# Patient Record
Sex: FEMALE | Race: WHITE
[De-identification: names, ages, dates, MRNs, and addresses within clinical notes are randomized per-mention and may not be internally consistent; named-entity substitution may affect disease eponyms.]

---

## 2020-09-10 ENCOUNTER — HOSPITAL ENCOUNTER (INPATIENT)
Dept: HOSPITAL 74 - JER | Age: 65
LOS: 5 days | Discharge: HOME | DRG: 854 | End: 2020-09-15
Attending: INTERNAL MEDICINE | Admitting: STUDENT IN AN ORGANIZED HEALTH CARE EDUCATION/TRAINING PROGRAM
Payer: COMMERCIAL

## 2020-09-10 VITALS — BODY MASS INDEX: 26.1 KG/M2

## 2020-09-10 DIAGNOSIS — K76.0: ICD-10-CM

## 2020-09-10 DIAGNOSIS — K29.70: ICD-10-CM

## 2020-09-10 DIAGNOSIS — K44.9: ICD-10-CM

## 2020-09-10 DIAGNOSIS — K80.01: ICD-10-CM

## 2020-09-10 DIAGNOSIS — E03.9: ICD-10-CM

## 2020-09-10 DIAGNOSIS — K57.10: ICD-10-CM

## 2020-09-10 DIAGNOSIS — R65.20: ICD-10-CM

## 2020-09-10 DIAGNOSIS — A41.89: Primary | ICD-10-CM

## 2020-09-10 LAB
ALBUMIN SERPL-MCNC: 3.6 G/DL (ref 3.4–5)
ALP SERPL-CCNC: 112 U/L (ref 45–117)
ALT SERPL-CCNC: 23 U/L (ref 13–61)
ANION GAP SERPL CALC-SCNC: 6 MMOL/L (ref 8–16)
AST SERPL-CCNC: 16 U/L (ref 15–37)
BASOPHILS # BLD: 0.5 % (ref 0–2)
BILIRUB SERPL-MCNC: 0.4 MG/DL (ref 0.2–1)
BUN SERPL-MCNC: 12.6 MG/DL (ref 7–18)
CALCIUM SERPL-MCNC: 8.8 MG/DL (ref 8.5–10.1)
CHLORIDE SERPL-SCNC: 102 MMOL/L (ref 98–107)
CO2 SERPL-SCNC: 30 MMOL/L (ref 21–32)
CREAT SERPL-MCNC: 1.1 MG/DL (ref 0.55–1.3)
DEPRECATED RDW RBC AUTO: 13.4 % (ref 11.6–15.6)
EOSINOPHIL # BLD: 0.8 % (ref 0–4.5)
GLUCOSE SERPL-MCNC: 98 MG/DL (ref 74–106)
HCT VFR BLD CALC: 44 % (ref 32.4–45.2)
HGB BLD-MCNC: 15 GM/DL (ref 10.7–15.3)
LIPASE SERPL-CCNC: 138 U/L (ref 73–393)
LYMPHOCYTES # BLD: 22.8 % (ref 8–40)
MCH RBC QN AUTO: 30.7 PG (ref 25.7–33.7)
MCHC RBC AUTO-ENTMCNC: 34.1 G/DL (ref 32–36)
MCV RBC: 90 FL (ref 80–96)
MONOCYTES # BLD AUTO: 7.9 % (ref 3.8–10.2)
NEUTROPHILS # BLD: 68 % (ref 42.8–82.8)
PLATELET # BLD AUTO: 245 K/MM3 (ref 134–434)
PMV BLD: 8.2 FL (ref 7.5–11.1)
POTASSIUM SERPLBLD-SCNC: 4.3 MMOL/L (ref 3.5–5.1)
PROT SERPL-MCNC: 7.6 G/DL (ref 6.4–8.2)
RBC # BLD AUTO: 4.89 M/MM3 (ref 3.6–5.2)
SODIUM SERPL-SCNC: 137 MMOL/L (ref 136–145)
WBC # BLD AUTO: 7.3 K/MM3 (ref 4–10)

## 2020-09-10 PROCEDURE — U0003 INFECTIOUS AGENT DETECTION BY NUCLEIC ACID (DNA OR RNA); SEVERE ACUTE RESPIRATORY SYNDROME CORONAVIRUS 2 (SARS-COV-2) (CORONAVIRUS DISEASE [COVID-19]), AMPLIFIED PROBE TECHNIQUE, MAKING USE OF HIGH THROUGHPUT TECHNOLOGIES AS DESCRIBED BY CMS-2020-01-R: HCPCS

## 2020-09-10 RX ADMIN — ALUMINUM HYDROXIDE, MAGNESIUM HYDROXIDE, AND SIMETHICONE ONE: 200; 200; 20 SUSPENSION ORAL at 18:10

## 2020-09-10 RX ADMIN — LIDOCAINE HYDROCHLORIDE ONE: 20 SOLUTION ORAL; TOPICAL at 18:09

## 2020-09-10 RX ADMIN — LIDOCAINE HYDROCHLORIDE ONE ML: 20 SOLUTION ORAL; TOPICAL at 18:05

## 2020-09-10 RX ADMIN — ALUMINUM HYDROXIDE, MAGNESIUM HYDROXIDE, AND SIMETHICONE ONE ML: 200; 200; 20 SUSPENSION ORAL at 18:05

## 2020-09-10 NOTE — PDOC
History of Present Illness





- General


Chief Complaint: Pain


Stated Complaint: PAIN


Time Seen by Provider: 09/10/20 17:10





- History of Present Illness


Initial Comments: 


65 YOF h/o hypothyroidism by gastritis presents for abdominal pain and melena of

two days duration. Per patient pain is located in the epigastrum radiating 

outward laterally and to the back. She describes the pain as 10/10 in intensity,

burning and stabbing in quality, she tried to take omeprazole but didnt 

experience any relief. She also endorses a bout of vomiting 2 days prior, as 

well as multiple bouts of tarry stool and some burning chest pain. Denies SOB, 

fever, chills, nausea, or diarrhea, changes in urinary habit. 








Past History





- Medical History


Allergies/Adverse Reactions: 


                                    Allergies











Allergy/AdvReac Type Severity Reaction Status Date / Time


 


No Known Allergies Allergy   Verified 09/10/20 16:33











Home Medications: 


Ambulatory Orders





Levothyroxine [Synthroid -] 50 mcg PO DAILY 09/10/20 








COPD: No


GI Disorders: Yes (GASTRITIS)


HTN: Yes


Thyroid Disease: Yes (HYPO)





- Reproductive History


Is Patient Pregnant Now?: No





- Psycho-Social/Smoking History


Smoking Status: No


Smoking History: Never smoked


Have you smoked in the past 12 months: No


Number of Cigarettes Smoked Daily: 0


Information on smoking cessation initiated: No





- Substance Abuse Hx (Audit-C & DAST Scrn)


How often the patient has a drink containing alcohol: Never


Score: In Men: 4 or > Positive; In Women: 3 or > Positive: 0


Screen Result (Pos requires Nsg. Audit-10AR): Negative


In the last yr the pt used illegal drug/Rx for NonMed reason: No


Score:  Yes response is considered Positive: 0


Screen Result (Positive result requires Nsg. DAST-10): Negative





**Review of Systems





- Review of Systems


Constitutional: Yes: See HPI


HEENTM: Yes: See HPI


Respiratory: Yes: See HPI


Cardiac (ROS): Yes: See HPI


ABD/GI: Yes: See HPI


: Yes: See HPI


Musculoskeletal: Yes: See HPI


Integumentary: Yes: See HPI


Neurological: Yes: See HPI


Endocrine: Yes: See HPI


Hematologic/Lymphatic: Yes: See HPI





*Physical Exam





- Vital Signs


                                Last Vital Signs











Temp Pulse Resp BP Pulse Ox


 


 98.6 F   76   18   132/83   97 


 


 09/10/20 16:29  09/10/20 16:29  09/10/20 16:29  09/10/20 16:29  09/10/20 16:29














- Physical Exam


General Appearance: Yes: Nourished, Moderate Distress


HEENT: positive: EOMI, LEE, Normal ENT Inspection, Normal Voice


Neck: positive: Trachea midline, Normal Thyroid


Respiratory/Chest: positive: Chest Tender, Lungs Clear, Normal Breath Sounds


Cardiovascular: positive: Regular Rhythm, Regular Rate, S1, S2


Gastrointestinal/Abdominal: positive: Tender, Soft, Tenderness


Rectal Exam: positive: normal exam


Musculoskeletal: positive: Normal Inspection


Extremity: positive: Normal Capillary Refill, Normal Inspection


Integumentary: positive: Normal Color, Dry, Warm





ED Treatment Course





- LABORATORY


CBC & Chemistry Diagram: 


                                 09/11/20 05:15





                                 09/11/20 05:15





Medical Decision Making





- Medical Decision Making


65 YOF h/o hypothyroidism by gastritis presents for abdominal pain and melena of

two days duration. Per patient pain is located in the epigastrum radiating 

outward laterally and to the back. She describes the pain as 10/10 in intensity,

burning and stabbing in quality, she tried to take omeprazole but didnt 

experience any relief. She also endorses a bout of vomiting 2 days prior, as 

well as multiple bouts of tarry stool and some burning chest pain. Denies SOB, 

fever, chills, nausea, or diarrhea, changes in urinary habit. Vitals wnl on 

arrival. Physical exam reveals tenderness to palpation of epigastrum and upper 

two quadrants of abdomen. Rectal exam unremarkable. 





ddx: peptic ulcer disease, AVM, bowel perforation, mesenteric ischemia, bowel 

obstruction 





plan: CBC, CMP, lactate, lipase, type and screen, coags, CT abdomen 





reassess: labs at time of sign out wnl, patient signed out to night team. 





  


09/11/20 07:54








Discharge





- Discharge Information


Problems reviewed: Yes


Clinical Impression/Diagnosis: 


 Acute cholecystitis due to biliary calculus





Condition: Guarded





- Follow up/Referral





- Patient Discharge Instructions





- Post Discharge Activity

## 2020-09-10 NOTE — PDOC
Documentation entered by Yasmin Rahman SCRIBE, acting as scribe for 

Tamar Pulido DO.








Tamar Pulido DO:  This documentation has been prepared by the Lacey lizama Brenda, SCRIBE, under my direction and personally reviewed by me in its 

entirety.  I confirm that the documentation accurately reflects all work, 

treatment, procedures, and medical decision making performed by me.  





Attending Attestation





- Resident


Resident Name: Narendra Trejo





- ED Attending Attestation


I have performed the following: I have examined & evaluated the patient, The 

case was reviewed & discussed with the resident, I agree w/resident's findings &

plan, Exceptions are as noted





- HPI


HPI: 





09/10/20 17:51


The patient is a 65 year old female with a significant PMH of hypothyroidism who

presents to the emergency department fro evaluation of 2 days of melena and 

abdominal pain in the epigastric region radiating outward to her sides and to 

her back. Also endorses vomitting 2 days ago along with burning chest pain. 





The patient denies shortness of breath, headache and dizziness. Denies fever, 

chills and constipation. Denies dysuria, frequency, urgency and hematuria.





Allergies: NKA


Social history: No reported hx of tobacco use, alcohol use or illicit drug use. 


PCP: [facility]ry

















- Physicial Exam


PE: 





09/10/20 17:18


GENERAL: Awake, alert, and fully oriented, in no acute distress


HEAD: No signs of trauma


NECK: Normal ROM, supple, no lymphadenopathy, JVD, or masses


LUNGS: Breath sounds equal, clear to auscultation bilaterally.  No wheezes, and 

no crackles


HEART: Regular rate and rhythm, normal S1 and S2, no murmurs, rubs or gallops


ABDOMEN: (+) Tenderness to palpation diffusely (+) Tender to palpation on the 

upper abdomen,right upper/left upper quadrant to the mid epigastric region to 

the umbilicous. Soft. Normoactive bowel sounds.  No guarding, no rebound.  No 

masses


EXTREMITIES: Normal range of motion, no edema.  No clubbing or cyanosis. No 

cords, erythema, or tenderness


NEUROLOGICAL: Cranial nerves II through XII grossly intact.  Normal speech, 

normal gait


SKIN: Warm, Dry, normal turgor, no rashes or lesions noted.











- Medical Decision Making





09/10/20 18:14


a/p: 64yo female with abd pain x 3 days 


-first 2 days were assoc with n/v - nbnb


-all 3 days with 3 episodes of diarrhea a day, now with black tarry stool


-pt with upper abd pain


-will send labs, lactate, ct abd/pelvis


-protonix iv


-ivf


-will need admission


09/10/20 18:57


hgb 15





09/10/20 19:01


no elevated wbc


09/10/20 19:15


bun/cr normal


trop pending


09/10/20 19:22


trop neg


pt to ct


still with pain, will repeat morphine dosing


09/10/20 22:47


pt with acute jonny


call placed to Dr. Carty


microblog sent to [facility]


MRCP ordered


09/10/20 23:27


resident discussed the case with Dr. Carty who will see the patient in consult


09/10/20 23:42


resident discussed the case with [facility] who accepts pt to service


pts pain controlled at this time





**Heart Score/ECG Review





- ECG Intrepretation


Comment:: 





09/10/20 19:04


sinus at 66, nl axis, nl interval, no acute st/t wave findings





Discharge





- Discharge Information


Problems reviewed: Yes


Clinical Impression/Diagnosis: 


 Acute cholecystitis due to biliary calculus





Condition: Guarded





- Admission


Yes





- Follow up/Referral


Referrals: 


Blake Powell MD [Primary Care Provider] - 





- Patient Discharge Instructions





- Post Discharge Activity

## 2020-09-10 NOTE — PDOC
*Physical Exam





- Vital Signs


                                Last Vital Signs











Temp Pulse Resp BP Pulse Ox


 


 98.6 F   74   18   179/82 H  99 


 


 09/10/20 16:29  09/10/20 19:09  09/10/20 19:09  09/10/20 19:09  09/10/20 19:09














- Physical Exam





09/10/20 19:37


Signout from Dr. Neil BROUSSARD pain 





ED Treatment Course





- LABORATORY


CBC & Chemistry Diagram: 


                                 09/10/20 17:45





                                 09/10/20 17:45





- ADDITIONAL ORDERS


Additional order review: 


                               Laboratory  Results











  09/10/20 09/10/20 09/10/20





  17:45 17:45 17:45


 


Sodium   


 


Potassium   


 


Chloride   


 


Carbon Dioxide   


 


Anion Gap   


 


BUN   


 


Creatinine   


 


Est GFR (CKD-EPI)AfAm   


 


Est GFR (CKD-EPI)NonAf   


 


Random Glucose   


 


Lactic Acid    1.0


 


Calcium   


 


Total Bilirubin   


 


AST   


 


ALT   


 


Alkaline Phosphatase   


 


Creatine Kinase  84  


 


Troponin I  < 0.02  


 


Total Protein   


 


Albumin   


 


Lipase   


 


Blood Type   A POSITIVE 


 


Antibody Screen   Negative 














  09/10/20





  17:45


 


Sodium  137


 


Potassium  4.3


 


Chloride  102


 


Carbon Dioxide  30


 


Anion Gap  6 L


 


BUN  12.6


 


Creatinine  1.1


 


Est GFR (CKD-EPI)AfAm  60.59


 


Est GFR (CKD-EPI)NonAf  52.28


 


Random Glucose  98


 


Lactic Acid 


 


Calcium  8.8


 


Total Bilirubin  0.4


 


AST  16


 


ALT  23


 


Alkaline Phosphatase  112


 


Creatine Kinase 


 


Troponin I 


 


Total Protein  7.6


 


Albumin  3.6


 


Lipase  138


 


Blood Type 


 


Antibody Screen 








                                        











  09/10/20





  17:45


 


RBC  4.89


 


MCV  90.0


 


MCHC  34.1


 


RDW  13.4


 


MPV  8.2


 


Neutrophils %  68.0


 


Lymphocytes %  22.8  D


 


Monocytes %  7.9


 


Eosinophils %  0.8


 


Basophils %  0.5














- Medications


Given in the ED: 


ED Medications














Discontinued Medications














Generic Name Dose Route Start Last Admin





  Trade Name Freq  PRN Reason Stop Dose Admin


 


Al Hydroxide/Mg Hydroxide  30 ml  09/10/20 17:46  09/10/20 18:10





  Mylanta Suspension -  PO  09/10/20 17:47  Not Given





  ONCE ONE  


 


Famotidine/Sodium Chloride  20 mg in 50 mls @ 100 mls/hr  09/10/20 17:46  

09/10/20 18:10





  Pepcid 20 Mg Premixed Ivpb -  IVPB  09/10/20 18:15  Not Given





  ONCE ONE  


 


Lactated Ringer's  1,000 ml  09/10/20 18:09  09/10/20 18:09





  Lactated Ringers Solution  IV  09/10/20 18:10  1,000 ml





  ONCE ONE   Administration


 


Lidocaine HCl  20 ml  09/10/20 17:46  09/10/20 18:09





  Xylocaine 2% Viscous Oral -  MM  09/10/20 17:47  Not Given





  ONCE ONE  


 


Morphine Sulfate  2 mg  09/10/20 18:09  09/10/20 18:14





  Morphine Injection -  IVPUSH  09/10/20 18:10  2 mg





  ONCE ONE   Administration


 


Morphine Sulfate  4 mg  09/10/20 19:21  09/10/20 19:26





  Morphine Injection -  IVPUSH  09/10/20 19:22  4 mg





  ONCE ONE   Administration


 


Ondansetron HCl  4 mg  09/10/20 19:21  09/10/20 19:26





  Zofran Injection  IVPUSH  09/10/20 19:22  4 mg





  ONCE ONE   Administration


 


Pantoprazole Sodium  40 mg  09/10/20 17:58  09/10/20 18:05





  Protonix Iv  IVPUSH  09/10/20 17:59  40 mg





  ONCE ONE   Administration














Discharge





- Discharge Information


Problems reviewed: Yes


Clinical Impression/Diagnosis: 


 Acute cholecystitis due to biliary calculus





Condition: Guarded





- Admission


Yes





- Follow up/Referral


Referrals: 


Blake Powell MD [Primary Care Provider] - 





- Patient Discharge Instructions





- Post Discharge Activity

## 2020-09-11 LAB
ALBUMIN SERPL-MCNC: 3.6 G/DL (ref 3.4–5)
ALP SERPL-CCNC: 250 U/L (ref 45–117)
ALT SERPL-CCNC: 267 U/L (ref 13–61)
AMYLASE SERPL-CCNC: 26 U/L (ref 25–115)
ANION GAP SERPL CALC-SCNC: 7 MMOL/L (ref 8–16)
APPEARANCE UR: CLEAR
AST SERPL-CCNC: 284 U/L (ref 15–37)
BASOPHILS # BLD: 0.5 % (ref 0–2)
BILIRUB SERPL-MCNC: 0.9 MG/DL (ref 0.2–1)
BILIRUB UR STRIP.AUTO-MCNC: NEGATIVE MG/DL
BUN SERPL-MCNC: 9.1 MG/DL (ref 7–18)
CALCIUM SERPL-MCNC: 8.6 MG/DL (ref 8.5–10.1)
CHLORIDE SERPL-SCNC: 104 MMOL/L (ref 98–107)
CO2 SERPL-SCNC: 27 MMOL/L (ref 21–32)
COLOR UR: YELLOW
CREAT SERPL-MCNC: 1.1 MG/DL (ref 0.55–1.3)
DEPRECATED RDW RBC AUTO: 13.5 % (ref 11.6–15.6)
EOSINOPHIL # BLD: 0.8 % (ref 0–4.5)
GLUCOSE SERPL-MCNC: 114 MG/DL (ref 74–106)
HCT VFR BLD CALC: 42.5 % (ref 32.4–45.2)
HGB BLD-MCNC: 14.3 GM/DL (ref 10.7–15.3)
INR BLD: 1.08 (ref 0.83–1.09)
KETONES UR QL STRIP: NEGATIVE
LEUKOCYTE ESTERASE UR QL STRIP.AUTO: NEGATIVE
LIPASE SERPL-CCNC: 129 U/L (ref 73–393)
LYMPHOCYTES # BLD: 22.1 % (ref 8–40)
MAGNESIUM SERPL-MCNC: 2.6 MG/DL (ref 1.8–2.4)
MCH RBC QN AUTO: 30.1 PG (ref 25.7–33.7)
MCHC RBC AUTO-ENTMCNC: 33.8 G/DL (ref 32–36)
MCV RBC: 89.3 FL (ref 80–96)
MONOCYTES # BLD AUTO: 9.4 % (ref 3.8–10.2)
NEUTROPHILS # BLD: 67.2 % (ref 42.8–82.8)
NITRITE UR QL STRIP: NEGATIVE
PH UR: > 9 [PH] (ref 5–8)
PHOSPHATE SERPL-MCNC: 4.8 MG/DL (ref 2.5–4.9)
PLATELET # BLD AUTO: 234 K/MM3 (ref 134–434)
PMV BLD: 7.6 FL (ref 7.5–11.1)
POTASSIUM SERPLBLD-SCNC: 4.2 MMOL/L (ref 3.5–5.1)
PROT SERPL-MCNC: 7.4 G/DL (ref 6.4–8.2)
PROT UR QL STRIP: NEGATIVE
PROT UR QL STRIP: NEGATIVE
PT PNL PPP: 12.8 SEC (ref 9.7–13)
RBC # BLD AUTO: 4.76 M/MM3 (ref 3.6–5.2)
SODIUM SERPL-SCNC: 138 MMOL/L (ref 136–145)
SP GR UR: 1.05 (ref 1.01–1.03)
UROBILINOGEN UR STRIP-MCNC: 0.2 MG/DL (ref 0.2–1)
WBC # BLD AUTO: 5.8 K/MM3 (ref 4–10)

## 2020-09-11 PROCEDURE — 0FT44ZZ RESECTION OF GALLBLADDER, PERCUTANEOUS ENDOSCOPIC APPROACH: ICD-10-PCS | Performed by: SURGERY

## 2020-09-11 RX ADMIN — SODIUM CHLORIDE, POTASSIUM CHLORIDE, SODIUM LACTATE AND CALCIUM CHLORIDE SCH MLS/HR: 600; 310; 30; 20 INJECTION, SOLUTION INTRAVENOUS at 19:04

## 2020-09-11 RX ADMIN — PIPERACILLIN SODIUM,TAZOBACTAM SODIUM SCH: 3; .375 INJECTION, POWDER, FOR SOLUTION INTRAVENOUS at 23:35

## 2020-09-11 RX ADMIN — PIPERACILLIN SODIUM,TAZOBACTAM SODIUM SCH: 3; .375 INJECTION, POWDER, FOR SOLUTION INTRAVENOUS at 23:34

## 2020-09-11 RX ADMIN — SODIUM CHLORIDE, POTASSIUM CHLORIDE, SODIUM LACTATE AND CALCIUM CHLORIDE SCH MLS/HR: 600; 310; 30; 20 INJECTION, SOLUTION INTRAVENOUS at 21:40

## 2020-09-11 NOTE — PN
Teaching Attending Note


Name of Resident: Sg Chan





ATTENDING PHYSICIAN STATEMENT





I saw and evaluated the patient.


I reviewed the resident's note and discussed the case with the resident.


I agree with the resident's findings and plan as documented.








SUBJECTIVE:


Seen and examined at bedside.  No acute distress, mild abdominal tenderness.  

LFTs noted to be increasing.  Discussed with surgery: Given the large size of 

the stone (2.7 cm) they believe this represents extrinsic compression of the 

common bile duct and not current passage of a stone.  Would prefer to defer 

additional imaging and take straight to the operating room.





OBJECTIVE


                                Last Vital Signs











Temp Pulse Resp BP Pulse Ox


 


 97.6 F   64   18   146/57 L  97 


 


 09/11/20 05:54  09/11/20 05:54  09/11/20 05:54  09/11/20 05:54  09/11/20 05:54











PE: Per resident note


Labs/Imaging: reviewed





ASSESSMENT/PLAN


65-year-old female with a history of hypothyroidism, NAFLD, gastritis who 

presents with acute cholecystitis





#Acute calculus cholecystitis


Surgery on board: 4 OR today


Ceftriaxone, Flagyl prior to surgery


N.p.o.


Fluids





#Hypothyroidism


Continue home levothyroxine





#Gastritis


Pantoprazole

## 2020-09-11 NOTE — PN
Physical Exam: 


SUBJECTIVE: Patient seen and examined at bedside. She endorses some right upper 

quadrant abdominal pain, ongoing for past week. 





OBJECTIVE:


                                   Vital Signs











 Period  Temp  Pulse  Resp  BP Sys/Zarate  Pulse Ox


 


 Last 24 Hr  97.6 F-98.6 F  63-78  18-18  132-179/54-83  95-99








GENERAL: The patient is awake, alert, and fully oriented, in no acute distress.


HEAD: Normocephalic, atraumatic. 


EYES: PERRL, extraocular movements intact, sclera anicteric, conjunctiva clear. 


ENT: Oropharynx clear, without erythema or exudates. Moist mucous membranes.


NECK: Trachea midline, full range of motion. Supple without lymphadenopathy.


LUNGS: Breath sounds equal, clear to auscultation bilaterally. No wheezes, no 

crackles. No accessory muscle use. 


HEART: Regular rate and rhythm. S1, S2 without murmur, rub or gallop.


ABDOMEN: Soft. Tender to deep palpation X4 quadrants, worst at right upper 

quadrant. Negative Hayden's sign. Negative rebound tenderness, no guarding. 

Normoactive bowel sounds x4 quadrants. No hepatosplenomegaly, no masses 

appreciated.


EXTREMITIES: 2+ radial, dorsalis pedis pulses bilaterally. Warm, well-perfused. 

No lower extremity edema bilaterally.


NEUROLOGICAL: Cranial nerves II through XII grossly intact. Normal speech. No 

gross focal deficits. 


PSYCH: Normal mood, normal affect upon my encounter. 


SKIN: Warm, dry.





                         Laboratory Results - last 24 hr











  09/10/20 09/10/20 09/10/20





  17:45 17:45 17:45


 


WBC  7.3  


 


RBC  4.89  


 


Hgb  15.0  


 


Hct  44.0  


 


MCV  90.0  


 


MCH  30.7  


 


MCHC  34.1  


 


RDW  13.4  


 


Plt Count  245  


 


MPV  8.2  


 


Absolute Neuts (auto)  5.0  


 


Neutrophils %  68.0  


 


Lymphocytes %  22.8  D  


 


Monocytes %  7.9  


 


Eosinophils %  0.8  


 


Basophils %  0.5  


 


Nucleated RBC %  0  


 


PT with INR   


 


INR   


 


Sodium   137 


 


Potassium   4.3 


 


Chloride   102 


 


Carbon Dioxide   30 


 


Anion Gap   6 L 


 


BUN   12.6 


 


Creatinine   1.1 


 


Est GFR (CKD-EPI)AfAm   60.59 


 


Est GFR (CKD-EPI)NonAf   52.28 


 


Random Glucose   98 


 


Lactic Acid    1.0


 


Calcium   8.8 


 


Phosphorus   


 


Magnesium   


 


Total Bilirubin   0.4 


 


AST   16 


 


ALT   23 


 


Alkaline Phosphatase   112 


 


Creatine Kinase   


 


Troponin I   


 


Total Protein   7.6 


 


Albumin   3.6 


 


Total Amylase   


 


Lipase   138 


 


TSH   


 


Urine Color   


 


Urine Appearance   


 


Urine pH   


 


Ur Specific Gravity   


 


Urine Protein   


 


Urine Glucose (UA)   


 


Urine Ketones   


 


Urine Blood   


 


Urine Nitrite   


 


Urine Bilirubin   


 


Urine Urobilinogen   


 


Ur Leukocyte Esterase   


 


Blood Type   


 


Antibody Screen   














  09/10/20 09/10/20 09/11/20





  17:45 17:45 00:20


 


WBC   


 


RBC   


 


Hgb   


 


Hct   


 


MCV   


 


MCH   


 


MCHC   


 


RDW   


 


Plt Count   


 


MPV   


 


Absolute Neuts (auto)   


 


Neutrophils %   


 


Lymphocytes %   


 


Monocytes %   


 


Eosinophils %   


 


Basophils %   


 


Nucleated RBC %   


 


PT with INR   


 


INR   


 


Sodium   


 


Potassium   


 


Chloride   


 


Carbon Dioxide   


 


Anion Gap   


 


BUN   


 


Creatinine   


 


Est GFR (CKD-EPI)AfAm   


 


Est GFR (CKD-EPI)NonAf   


 


Random Glucose   


 


Lactic Acid   


 


Calcium   


 


Phosphorus   


 


Magnesium   


 


Total Bilirubin   


 


AST   


 


ALT   


 


Alkaline Phosphatase   


 


Creatine Kinase   84 


 


Troponin I   < 0.02 


 


Total Protein   


 


Albumin   


 


Total Amylase   


 


Lipase   


 


TSH   


 


Urine Color    Yellow


 


Urine Appearance    Clear


 


Urine pH    > 9.0 H


 


Ur Specific Gravity    1.047 H


 


Urine Protein    Negative


 


Urine Glucose (UA)    Negative


 


Urine Ketones    Negative


 


Urine Blood    Negative


 


Urine Nitrite    Negative


 


Urine Bilirubin    Negative


 


Urine Urobilinogen    0.2


 


Ur Leukocyte Esterase    Negative


 


Blood Type  A POSITIVE  


 


Antibody Screen  Negative  














  09/11/20 09/11/20 09/11/20





  01:00 05:15 05:15


 


WBC   5.8 


 


RBC   4.76 


 


Hgb   14.3 


 


Hct   42.5 


 


MCV   89.3 


 


MCH   30.1 


 


MCHC   33.8 


 


RDW   13.5 


 


Plt Count   234 


 


MPV   7.6 


 


Absolute Neuts (auto)   3.9 


 


Neutrophils %   67.2 


 


Lymphocytes %   22.1 


 


Monocytes %   9.4 


 


Eosinophils %   0.8 


 


Basophils %   0.5 


 


Nucleated RBC %   0 


 


PT with INR    12.80


 


INR    1.08


 


Sodium   


 


Potassium   


 


Chloride   


 


Carbon Dioxide   


 


Anion Gap   


 


BUN   


 


Creatinine   


 


Est GFR (CKD-EPI)AfAm   


 


Est GFR (CKD-EPI)NonAf   


 


Random Glucose   


 


Lactic Acid  3.2 H*  


 


Calcium   


 


Phosphorus   


 


Magnesium   


 


Total Bilirubin   


 


AST   


 


ALT   


 


Alkaline Phosphatase   


 


Creatine Kinase   


 


Troponin I   


 


Total Protein   


 


Albumin   


 


Total Amylase   


 


Lipase   


 


TSH   


 


Urine Color   


 


Urine Appearance   


 


Urine pH   


 


Ur Specific Gravity   


 


Urine Protein   


 


Urine Glucose (UA)   


 


Urine Ketones   


 


Urine Blood   


 


Urine Nitrite   


 


Urine Bilirubin   


 


Urine Urobilinogen   


 


Ur Leukocyte Esterase   


 


Blood Type   


 


Antibody Screen   














  09/11/20 09/11/20





  05:15 05:49


 


WBC  


 


RBC  


 


Hgb  


 


Hct  


 


MCV  


 


MCH  


 


MCHC  


 


RDW  


 


Plt Count  


 


MPV  


 


Absolute Neuts (auto)  


 


Neutrophils %  


 


Lymphocytes %  


 


Monocytes %  


 


Eosinophils %  


 


Basophils %  


 


Nucleated RBC %  


 


PT with INR  


 


INR  


 


Sodium  138 


 


Potassium  4.2 


 


Chloride  104 


 


Carbon Dioxide  27 


 


Anion Gap  7 L 


 


BUN  9.1 


 


Creatinine  1.1 


 


Est GFR (CKD-EPI)AfAm  60.59 


 


Est GFR (CKD-EPI)NonAf  52.28 


 


Random Glucose  114 H 


 


Lactic Acid   1.2


 


Calcium  8.6 


 


Phosphorus  4.8 


 


Magnesium  2.6 H 


 


Total Bilirubin  0.9 


 


AST  284 H 


 


ALT  267 H 


 


Alkaline Phosphatase  250 H 


 


Creatine Kinase  


 


Troponin I  


 


Total Protein  7.4 


 


Albumin  3.6 


 


Total Amylase  26 


 


Lipase  129 


 


TSH  5.66 H 


 


Urine Color  


 


Urine Appearance  


 


Urine pH  


 


Ur Specific Gravity  


 


Urine Protein  


 


Urine Glucose (UA)  


 


Urine Ketones  


 


Urine Blood  


 


Urine Nitrite  


 


Urine Bilirubin  


 


Urine Urobilinogen  


 


Ur Leukocyte Esterase  


 


Blood Type  


 


Antibody Screen  








Active Medications











Generic Name Dose Route Start Last Admin





  Trade Name Freq  PRN Reason Stop Dose Admin


 


Hydromorphone HCl  2 mg  09/11/20 00:01 





  Dilaudid Vial -  IVPUSH  





  Q8H PRN  





  PAIN LEVEL 6-10  


 


Lactated Ringer's  1,000 mls @ 83 mls/hr  09/11/20 00:15  09/11/20 00:48





  Lactated Ringers Solution  IV   83 mls/hr





  ASDIR ALAN   Administration


 


Piperacillin Sod/Tazobactam  50 mls @ 100 mls/hr  09/11/20 03:00 





  Sod 3.375 gm/ Dextrose  IVPB  





  Q6H-IV ALAN  





  Protocol  


 


Ceftriaxone Sodium 1 gm/  50 mls @ 100 mls/hr  09/11/20 10:00  09/11/20 11:25





  Dextrose  IVPB   100 mls/hr





  DAILY ALAN   Administration


 


Metronidazole  500 mg in 100 mls @ 100 mls/hr  09/11/20 10:00  09/11/20 10:28





  Flagyl 500mg Premixed Ivpb -  IVPB   100 mls/hr





  Q8H-IV ALAN   Administration


 


Levothyroxine Sodium  50 mcg  09/12/20 07:00 





  Synthroid -  PO  





  ACBK ALAN  


 


Ondansetron HCl  4 mg  09/12/20 05:00 





  Zofran Injection  IVPUSH  





  Q6H PRN  





  NAUSEA AND/OR VOMITING  


 


Pantoprazole Sodium  40 mg  09/12/20 10:00 





  Protonix Iv  IVPUSH  





  DAILY ALAN  











ASSESSMENT/PLAN:


Patient is an 85 year old female with history of hypothyroidism presents with 

complaint of abdominal pain. 





Acute calculous cholecystitis


 -CT abdomen, pelvis reveals gallbladder overdistended with diffuse wall 

thickening, pericholecystic stranding. 2.7cm calculus at gallbladder neck. 


 -Right upper quadrant ultrasound reveals common bile duct at 0.7cm, without 

gross calculus noted.


 -NPO


 -IV Lactated Ringer's at 83mL/ hour


 -Ceftriaxone, Flagyl


 -General surgery consult (Dr. Carty) appreciated. Patient will undergo 

laparoscopic cholecystectomy today.





History of hypothyroidism


 -Continue home Synthroid





FEN 


 -IV Lactated Ringer's at 83mL/ hour


 -Follow BMP


 -NPO pending surgical procedure today. 





Prophylaxis


 -SCDs bilateral lower extremities. Holding chemical anticoagulation in 

anticipation surgical procedure today.





Disposition


 -Continue care in medical- surgical floor. 





Visit type





- Emergency Visit


Emergency Visit: Yes


ED Registration Date: 09/11/20


Care time: The patient presented to the Emergency Department on the above date 

and was hospitalized for further evaluation of their emergent condition.





- New Patient


This patient is new to me today: Yes


Date on this admission: 09/11/20





- Critical Care


Critical Care patient: No





- Discharge Referral


Referred to [facility] No





- Medication Review


Med list reviewed for High Risk Meds patients 65 and older: Yes





ATTENDING PHYSICIAN STATEMENT





I saw and evaluated the patient.


I reviewed the resident's note and discussed the case with the resident.


I agree with the resident's findings and plan as documented.








SUBJECTIVE:








OBJECTIVE:








ASSESSMENT AND PLAN:

## 2020-09-11 NOTE — EKG
Test Reason : 

Blood Pressure : ***/*** mmHG

Vent. Rate : 066 BPM     Atrial Rate : 066 BPM

   P-R Int : 144 ms          QRS Dur : 082 ms

    QT Int : 428 ms       P-R-T Axes : 051 039 020 degrees

   QTc Int : 448 ms

 

NORMAL SINUS RHYTHM

NORMAL ECG

WHEN COMPARED WITH ECG OF 03-OCT-2013 13:15,

NO SIGNIFICANT CHANGE WAS FOUND

Confirmed by NAET MARVIN MD ([id number]) on 9/11/2020 9:18:53 AM

 

Referred By:             Confirmed By:NATE MARVIN MD

## 2020-09-11 NOTE — CONSULT
- Consultation


REQUESTING PROVIDER: 





CONSULT REQUEST: We have been asked to surgically evaluate this patient for 

acute cholecystitis





Hospitalist:Tushar Walton MD





HISTORY OF PRESENT ILLNESS:


64yo F was consulted to surgery for evaluation of acute cholecystitis.  Pt 

states that she started developing significant RUQ pain associated with nausea 

and vomiting.  Pt states she has history of epigastric pain after eating, but 

always associated it with gastritis.  Pt denies fever, chills.  





PMHx:  hypothyroidism        





PSHx:   hysterectomy


     


                                Home Medications











 Medication  Instructions  Recorded


 


Levothyroxine [Synthroid -] 50 mcg PO DAILY 09/10/20








                                    Allergies











Allergy/AdvReac Type Severity Reaction Status Date / Time


 


No Known Allergies Allergy   Verified 09/10/20 16:33








REVIEW OF SYSTEMS:


CONSTITUTIONAL: 


Absent:  fever, chills, diaphoresis, generalized weakness, malaise, loss of 

appetite, weight change


CARDIOVASCULAR: 


Absent: chest pain, syncope, palpitations, irregular heart rate, 

lightheadedness, peripheral edema


RESPIRATORY: 


Absent: cough, shortness of breath, dyspnea with exertion, wheezing, stridor, 

hemoptysis


GASTROINTESTINAL:


Absent: abdominal pain, abdominal distension, nausea, vomiting, diarrhea, 

constipation, melena, hematochezia


GENITOURINARY: 


Absent: dysuria, frequency, urgency, hesitancy, hematuria, flank pain, genital 

pain








PHYSICAL EXAM:


GENERAL: Awake, alert, and fully oriented, in no acute distress.


HEAD: Normal with no signs of trauma.


EYES: PERRL, sclera anicteric, conjunctiva clear.


NECK: Normal ROM, supple without lymphadenopathy, JVD, or masses.


LUNGS: breathing comfortably, No accessory muscle use.


ABDOMEN: Soft, moderate RUQ tenderness, not distended, no guarding, no rebound, 

no masses.  No organomegaly. 


MUSCULOSKELETAL: Normal ROM at all joints. No bony deformities or tenderness. No

CVA tenderness.


UPPER EXTREMITIES:  warm, well-perfused. No cyanosis. Cap refill <2 seconds. No 

peripheral edema.


LOWER EXTREMITIES: warm, well-perfused. No calf tenderness. No peripheral edema.




NEUROLOGICAL: Normal speech, gait not observed.


PSYCH: Cooperative. Good eye contact. Appropriate mood and affect.


SKIN: Warm, dry, normal turgor, no rashes or lesions noted.


                                   Vital Signs











Temperature  98 F   09/11/20 09:00


 


Pulse Rate  63   09/11/20 09:00


 


Respiratory Rate  18   09/11/20 09:00


 


Blood Pressure  153/78   09/11/20 09:00


 


O2 Sat by Pulse Oximetry (%)  95   09/11/20 09:00








                                   Lab Results











WBC  5.8 K/mm3 (4.0-10.0)   09/11/20  05:15    


 


RBC  4.76 M/mm3 (3.60-5.2)   09/11/20  05:15    


 


Hgb  14.3 GM/dL (10.7-15.3)   09/11/20  05:15    


 


Hct  42.5 % (32.4-45.2)   09/11/20  05:15    


 


MCV  89.3 fl (80-96)   09/11/20  05:15    


 


MCHC  33.8 g/dl (32.0-36.0)   09/11/20  05:15    


 


RDW  13.5 % (11.6-15.6)   09/11/20  05:15    


 


Plt Count  234 K/MM3 (134-434)   09/11/20  05:15    


 


INR  1.08  (0.83-1.09)   09/11/20  05:15    


 


Sodium  138 mmol/L (136-145)   09/11/20  05:15    


 


Potassium  4.2 mmol/L (3.5-5.1)   09/11/20  05:15    


 


Chloride  104 mmol/L ()   09/11/20  05:15    


 


Carbon Dioxide  27 mmol/L (21-32)   09/11/20  05:15    


 


Anion Gap  7 MMOL/L (8-16)  L  09/11/20  05:15    


 


BUN  9.1 mg/dL (7-18)   09/11/20  05:15    


 


Creatinine  1.1 mg/dL (0.55-1.3)   09/11/20  05:15    


 


Random Glucose  114 mg/dL ()  H  09/11/20  05:15    


 


Calcium  8.6 mg/dL (8.5-10.1)   09/11/20  05:15    


 


Blood Type  A POSITIVE   09/10/20  17:45    


 


Antibody Screen  Negative   09/10/20  17:45    











CT abdomen showed calculous cholecystitis, common bile duct dilated to 0.7cm, 

4x3 cm diverticulum in 2nd part of duodenum, hepatic steatosis, lt sided 

diaphragmatic hernia, hiatal hernia





Problem List





- Problems


(1) Acute cholecystitis due to biliary calculus


Assessment/Plan: 


Plan


    -will plan for Lap cholecystectomy later today. 


    -keep NPO, IVF, abx


    -elevated LFTs most likely due to compression of CBD from large stone


    -spoke with pt at length about surgery and pt agrees. 





Pt seen and discussed with Dr. Carty who agrees with plan


Code(s): K80.00 - CALCULUS OF GALLBLADDER W ACUTE CHOLECYST W/O OBSTRUCTION

## 2020-09-11 NOTE — HP
CHIEF COMPLAINT: abdominal pain and vomiting





PCP: Dustin





HISTORY OF PRESENT ILLNESS: 64 YO  lady with Mhx of hypothyroidism, and 

gastritis who presented to ED complaining from 6 days hx of RUQ pain associated 

with nausea and bilious vomiting, her symptoms started suddenly after eating 

fatty meal, the patient initially thought it was gastritis episode, and tried 

nexium without relieve, her symptoms continued to wax and wane over the past 

day. Pt reported that she noticed loose BM with 3 episodes of dark stool, no 

change in appetite and reported that food relieved the pain slightly, but she 

was not able to eat much due to the vomiting. Her pain worsen today and pt came 

to ED. 








ER course was notable for:


(1) CT abdomen showed calculous cholecystitis, common bile duct dilated to 

0.7cm, 4x3 cm diverticulum in 2nd part of duodenum, hepatic steatosis, lt sided 

diaphragmatic hernia, hiatal hernia


(2)US abdomen


(3)surgical consult obtained





Recent Travel: no





PAST MEDICAL HISTORY: hypothyroidism, and gastritis





PAST SURGICAL HISTORY: hysterectomy, skin fibroma removal





Social History:


Smoking: denies


Alcohol: denies


Drugs: denies





Allergies





No Known Allergies Allergy (Verified 09/10/20 16:33)


   








HOME MEDICATIONS:


                                Home Medications











 Medication  Instructions  Recorded


 


Levothyroxine [Synthroid -] 50 mcg PO DAILY 09/10/20








REVIEW OF SYSTEMS


CONSTITUTIONAL: fever, chills


HEENT: 


system reviewed, appears within normal limits


CARDIOVASCULAR: 


system reviewed, appears within normal limits


RESPIRATORY: 


system reviewed, appears within normal limits


GASTROINTESTINAL: see HPI


GENITOURINARY: 


system reviewed, appears within normal limits


MUSCULOSKELETAL: 


system reviewed, appears within normal limits


SKIN: 


system reviewed, appears within normal limits


HEMATOLOGIC/IMMUNOLOGIC: 


system reviewed, appears within normal limits


ENDOCRINE:


system reviewed, appears within normal limits


NEUROLOGIC: 


system reviewed, appears within normal limits


PSYCHIATRIC: 


system reviewed, appears within normal limits








PHYSICAL EXAMINATION


                               Vital Signs - 24 hr











  09/10/20 09/10/20 09/10/20





  16:29 19:09 22:50


 


Temperature 98.6 F  


 


Pulse Rate 76  


 


Pulse Rate [  74 78





Right Radial]   


 


Respiratory 18 18 18





Rate   


 


Blood Pressure 132/83  


 


Blood Pressure  179/82 H 133/54 L





[Left Arm]   


 


O2 Sat by Pulse 97 99 98





Oximetry (%)   











GENERAL: Awake, alert, and fully oriented, in no acute distress.


HEAD: Normal with no signs of trauma.


EYES: Pupils equal, round and reactive to light, extraocular movements intact, 

sclera anicteric, conjunctiva clear. No lid lag.


EARS, NOSE, THROAT: Ears normal, nares patent, oropharynx clear without 

exudates. Moist mucous membranes.


NECK: Normal range of motion, supple without lymphadenopathy, JVD, or masses.


LUNGS: Breath sounds equal, clear to auscultation bilaterally. No wheezes, and 

no crackles. No accessory muscle use.


HEART: Regular rate and rhythm, normal S1 and S2 without murmur, rub or gallop.


ABDOMEN: Soft, RUQ tenderness, Hayden sign +, not distended, normoactive bowel 

sounds.  No hepatomegaly or  splenomegaly. 


MUSCULOSKELETAL: Normal range of motion at all joints. No bony deformities or 

tenderness. No CVA tenderness.


UPPER EXTREMITIES: 2+ pulses, warm, well-perfused. No cyanosis. No clubbing. No 

peripheral edema.


LOWER EXTREMITIES: 2+ pulses, warm, well-perfused. No calf tenderness. No 

peripheral edema. 


NEUROLOGICAL:  Cranial nerves II-XII intact. Normal speech. Normal gait.


PSYCHIATRIC: Cooperative. Good eye contact. Appropriate mood and affect.


SKIN: Warm, dry, normal turgor, no rashes or lesions noted, normal capillary 

refill. 


                         Laboratory Results - last 24 hr











  09/10/20 09/10/20 09/10/20





  17:45 17:45 17:45


 


WBC  7.3  


 


RBC  4.89  


 


Hgb  15.0  


 


Hct  44.0  


 


MCV  90.0  


 


MCH  30.7  


 


MCHC  34.1  


 


RDW  13.4  


 


Plt Count  245  


 


MPV  8.2  


 


Absolute Neuts (auto)  5.0  


 


Neutrophils %  68.0  


 


Lymphocytes %  22.8  D  


 


Monocytes %  7.9  


 


Eosinophils %  0.8  


 


Basophils %  0.5  


 


Nucleated RBC %  0  


 


Sodium   137 


 


Potassium   4.3 


 


Chloride   102 


 


Carbon Dioxide   30 


 


Anion Gap   6 L 


 


BUN   12.6 


 


Creatinine   1.1 


 


Est GFR (CKD-EPI)AfAm   60.59 


 


Est GFR (CKD-EPI)NonAf   52.28 


 


Random Glucose   98 


 


Lactic Acid    1.0


 


Calcium   8.8 


 


Total Bilirubin   0.4 


 


AST   16 


 


ALT   23 


 


Alkaline Phosphatase   112 


 


Creatine Kinase   


 


Troponin I   


 


Total Protein   7.6 


 


Albumin   3.6 


 


Lipase   138 


 


Blood Type   


 


Antibody Screen   














  09/10/20 09/10/20





  17:45 17:45


 


WBC  


 


RBC  


 


Hgb  


 


Hct  


 


MCV  


 


MCH  


 


MCHC  


 


RDW  


 


Plt Count  


 


MPV  


 


Absolute Neuts (auto)  


 


Neutrophils %  


 


Lymphocytes %  


 


Monocytes %  


 


Eosinophils %  


 


Basophils %  


 


Nucleated RBC %  


 


Sodium  


 


Potassium  


 


Chloride  


 


Carbon Dioxide  


 


Anion Gap  


 


BUN  


 


Creatinine  


 


Est GFR (CKD-EPI)AfAm  


 


Est GFR (CKD-EPI)NonAf  


 


Random Glucose  


 


Lactic Acid  


 


Calcium  


 


Total Bilirubin  


 


AST  


 


ALT  


 


Alkaline Phosphatase  


 


Creatine Kinase   84


 


Troponin I   < 0.02


 


Total Protein  


 


Albumin  


 


Lipase  


 


Blood Type  A POSITIVE 


 


Antibody Screen  Negative 








                                Home Medications











 Medication  Instructions  Recorded


 


Levothyroxine [Synthroid -] 50 mcg PO DAILY 09/10/20











ASSESSMENT/PLAN:


64 YO  lady with Mhx of hypothyroidism, NAFLD and gastritis who p

resented to ED complaining from 6 days hx of RUQ pain associated with nausea and

bilious vomiting





# Acute calculous cholecystitis


reporting subjective fever, chills


no fever, no leukocytosis


CT abdomen showed calculous cholecystitis, common bile duct dilated to 0.7cm, 

4x3 cm diverticulum in 2nd part of duodenum, hepatic steatosis, lt sided 

diaphragmatic hernia, hiatal hernia


NPO, IV hydration, pain medications as indicated


zosyn


RCRI=0 (low risk patient), ASA2


obtain EKG


May need MRCP later


Surgical consult





Hypothyroidism


Gastritis


NAFLD





DVT prophylaxis with SCD





Family Medical History


Family History: Unremarkable





Visit type





- Medication Review


Med list reviewed for High Risk Meds patients 65 and older: Yes (yes)





- Emergency Visit


Emergency Visit: Yes


Care time: The patient presented to the Emergency Department on the above date 

and was hospitalized for further evaluation of their emergent condition.





- New Patient


This patient is new to me today: Yes


Date on this admission: 09/11/20





- Critical Care


Critical Care patient: No

## 2020-09-12 LAB
ALBUMIN SERPL-MCNC: 3 G/DL (ref 3.4–5)
ALP SERPL-CCNC: 175 U/L (ref 45–117)
ALT SERPL-CCNC: 223 U/L (ref 13–61)
ANION GAP SERPL CALC-SCNC: 7 MMOL/L (ref 8–16)
AST SERPL-CCNC: 124 U/L (ref 15–37)
BILIRUB SERPL-MCNC: 0.8 MG/DL (ref 0.2–1)
BUN SERPL-MCNC: 6.8 MG/DL (ref 7–18)
CALCIUM SERPL-MCNC: 7.9 MG/DL (ref 8.5–10.1)
CHLORIDE SERPL-SCNC: 106 MMOL/L (ref 98–107)
CO2 SERPL-SCNC: 27 MMOL/L (ref 21–32)
CREAT SERPL-MCNC: 0.8 MG/DL (ref 0.55–1.3)
DEPRECATED RDW RBC AUTO: 13.3 % (ref 11.6–15.6)
GLUCOSE SERPL-MCNC: 104 MG/DL (ref 74–106)
HCT VFR BLD CALC: 37.3 % (ref 32.4–45.2)
HGB BLD-MCNC: 12.7 GM/DL (ref 10.7–15.3)
MCH RBC QN AUTO: 31 PG (ref 25.7–33.7)
MCHC RBC AUTO-ENTMCNC: 34.1 G/DL (ref 32–36)
MCV RBC: 90.8 FL (ref 80–96)
PLATELET # BLD AUTO: 216 K/MM3 (ref 134–434)
PMV BLD: 8.4 FL (ref 7.5–11.1)
POTASSIUM SERPLBLD-SCNC: 3.9 MMOL/L (ref 3.5–5.1)
PROT SERPL-MCNC: 6.2 G/DL (ref 6.4–8.2)
RBC # BLD AUTO: 4.11 M/MM3 (ref 3.6–5.2)
SODIUM SERPL-SCNC: 140 MMOL/L (ref 136–145)
WBC # BLD AUTO: 7.8 K/MM3 (ref 4–10)

## 2020-09-12 RX ADMIN — LEVOTHYROXINE SODIUM SCH MCG: 50 TABLET ORAL at 05:59

## 2020-09-12 RX ADMIN — CEFTRIAXONE SCH MLS/HR: 1 INJECTION, POWDER, FOR SOLUTION INTRAMUSCULAR; INTRAVENOUS at 10:58

## 2020-09-12 RX ADMIN — PANTOPRAZOLE SODIUM SCH MG: 40 INJECTION, POWDER, FOR SOLUTION INTRAVENOUS at 10:58

## 2020-09-12 RX ADMIN — SODIUM CHLORIDE, POTASSIUM CHLORIDE, SODIUM LACTATE AND CALCIUM CHLORIDE SCH MLS/HR: 600; 310; 30; 20 INJECTION, SOLUTION INTRAVENOUS at 10:59

## 2020-09-12 NOTE — PN
Progress Note (short form)





- Note


Progress Note: 





Attending Surgeon POD#1 s/p lap jonny





c/o incisional pain; voiding and tolerating clear liquids





VSS AF





abdo-soft; tender at epigastric port site; SONG serous; port sites c/d/i o/w 

negative





t bili-nl; LFT's noted





IMP: doing well





PLAN Advance diet as tolerated; continue JPandas per orders; OOB.





Woo Carty MD FACS

## 2020-09-12 NOTE — PN
Progress Note (short form)





- Note


Progress Note: 





S: POD1. Improved today with residual pain around site. No events overnight.





                                   Vital Signs











Temperature  98.2 F   09/12/20 10:55


 


Pulse Rate  59 L  09/12/20 10:55


 


Respiratory Rate  20 09/12/20 10:55


 


Blood Pressure  120/57 L  09/12/20 10:55


 


O2 Sat by Pulse Oximetry (%)  96   09/12/20 10:55








PE:


Gen: NAD, awake, alert, oriented x3 sitting up in bed


HEENT: NC/AT, MICHELLE, MMM


LUNG: CTA b/l without wheezes or rhonchi


CARD: RRR no murmurs


ABD: Soft, minimal tenderness near surgical site, SONG drain with ~10-15 cc 

serosanguinous fluid with milking, laparoscopic sites C/D/I, nondistendend, 

hypoactive BS, no guarding no rebound.


EXT: No edema, 2+ pulses b/l.





                                    CBC, BMP





                                 09/12/20 08:05 





                                 09/12/20 08:05 





                                  Hepatic Panel











Total Bilirubin  0.8 mg/dL (0.2-1)   09/12/20  08:05    


 


AST  124 U/L (15-37)  H  09/12/20  08:05    


 


ALT  223 U/L (13-61)  H  09/12/20  08:05    


 


Alkaline Phosphatase  175 U/L ()  H  09/12/20  08:05    


 


Albumin  3.0 g/dl (3.4-5.0)  L  09/12/20  08:05    








Active Medications





Acetaminophen (Ofirmev Injection -)  1,000 mg IVPB Q6H PRN


   PRN Reason: PAIN LEVEL 1 - 3


   Stop: 09/12/20 17:25


   Last Admin: 09/11/20 18:00 Dose:  1,000 mg


   Documented by: 


Fentanyl (Sublimaze Injection -)  50 mcg IVPUSH [id number] PRN


   PRN Reason: PAIN-PACU ORDER X 4 DOSES ONLY


   Last Admin: 09/11/20 17:50 Dose:  50 mcg


   Documented by: 


Metronidazole (Flagyl 500mg Premixed Ivpb -)  500 mg in 100 mls @ 100 mls/hr 

IVPB Q8H-IV ALAN


   Last Admin: 09/12/20 10:58 Dose:  100 mls/hr


   Documented by: 


Ceftriaxone Sodium 1 gm/ (Dextrose)  50 mls @ 100 mls/hr IVPB DAILY [facility]


   Last Admin: 09/12/20 10:58 Dose:  100 mls/hr


   Documented by: 


Lactated Ringer's (Lactated Ringers Solution)  1,000 mls @ 83 mls/hr IV ASDIR 

[facility]


   Last Admin: 09/12/20 10:59 Dose:  83 mls/hr


   Documented by: 


Levothyroxine Sodium (Synthroid -)  50 mcg PO ACBK [facility]


   Last Admin: 09/12/20 05:59 Dose:  50 mcg


   Documented by: 


Morphine Sulfate (Morphine Sulfate)  2 mg IVPUSH Q4H PRN


   PRN Reason: PAIN LEVEL 7 - 10


Ondansetron HCl (Zofran Injection)  4 mg IVPUSH Q6H PRN


   PRN Reason: NAUSEA AND/OR VOMITING


Oxycodone HCl (Roxicodone -)  5 mg PO Q6H PRN


   PRN Reason: PAIN LEVEL 1-5


Oxycodone HCl (Roxicodone -)  10 mg PO Q6H PRN


   PRN Reason: PAIN LEVEL 6-10


   Last Admin: 09/12/20 06:30 Dose:  10 mg


   Documented by: 


Pantoprazole Sodium (Protonix Iv)  40 mg IVPUSH DAILY [facility]


   Last Admin: 09/12/20 10:58 Dose:  40 mg


   Documented by: 





Assessment and Plan:


Acute calculus cholecystitis


History of Hypothyroidism





--POD 1 lap cholecystectomy


--Drain maintenance


--Surgery on board


--Advance diet as tolerated


--Continue PRN pain control


--Monitor for flatus/BM


--Continue Rocephin and Flagyl


--Discontinue IVF considering tolerance to PO regimen


--Patient to be out of bed to aid in recovery


--Incentive spirometer as directed





--Continue home Synthroid





Dispo: continue monitoring


Twan Diop, DO - IM

## 2020-09-12 NOTE — OP
Operative Note





- Note:


Operative Date: 09/11/20


Pre-Operative Diagnosis: acute cholecystitis/cholelithiasis/Mirizzis syndrome


Operation: laparoscopic cholecystectomy


Findings: 





acute cholecystitis/cholelithiasis/Mirizzis syndrome


Post-Operative Diagnosis: Same as Pre-op


Surgeon: Woo Carty


Assistant: Becky Rowland


Anesthesiologist/CRNA: Nnamdi Betancourt


Anesthesia: General


Specimens Removed: gallbladder and contents


Estimated Blood Loss (mls): 20


Drains & Tubes with Location: 10 mm SONG in the gallbladder fossa

## 2020-09-13 LAB
ALBUMIN SERPL-MCNC: 3.1 G/DL (ref 3.4–5)
ALP SERPL-CCNC: 157 U/L (ref 45–117)
ALT SERPL-CCNC: 148 U/L (ref 13–61)
ANION GAP SERPL CALC-SCNC: 4 MMOL/L (ref 8–16)
AST SERPL-CCNC: 36 U/L (ref 15–37)
BILIRUB SERPL-MCNC: 0.9 MG/DL (ref 0.2–1)
BUN SERPL-MCNC: 6.4 MG/DL (ref 7–18)
CALCIUM SERPL-MCNC: 8.4 MG/DL (ref 8.5–10.1)
CHLORIDE SERPL-SCNC: 104 MMOL/L (ref 98–107)
CO2 SERPL-SCNC: 32 MMOL/L (ref 21–32)
CREAT SERPL-MCNC: 0.9 MG/DL (ref 0.55–1.3)
GLUCOSE SERPL-MCNC: 100 MG/DL (ref 74–106)
POTASSIUM SERPLBLD-SCNC: 3.9 MMOL/L (ref 3.5–5.1)
PROT SERPL-MCNC: 6.9 G/DL (ref 6.4–8.2)
SODIUM SERPL-SCNC: 140 MMOL/L (ref 136–145)

## 2020-09-13 RX ADMIN — PANTOPRAZOLE SODIUM SCH MG: 40 INJECTION, POWDER, FOR SOLUTION INTRAVENOUS at 10:59

## 2020-09-13 RX ADMIN — CEFTRIAXONE SCH MLS/HR: 1 INJECTION, POWDER, FOR SOLUTION INTRAMUSCULAR; INTRAVENOUS at 10:59

## 2020-09-13 RX ADMIN — LEVOTHYROXINE SODIUM SCH MCG: 50 TABLET ORAL at 06:30

## 2020-09-13 NOTE — PN
Progress Note (short form)





- Note


Progress Note: 





Attending Surgeon POD#2





Feels better; tolerating diet





VSS AF





abdo- soft; port sites c/d/i and w/less ttp; SONG serous.





LFT's trending down; bili nl





WBC-nl





IMP: doing well





PLAN:Advance diet; continue drain; anticipate d/c 09/14/2020

## 2020-09-13 NOTE — PN
Progress Note (short form)





- Note


Progress Note: 





S: POD2. Improved. Patient passed flatus today. Otherwise without much 

complaints today. Pt able to tolerate liquids, but satiates quickly





                                   Vital Signs











Temperature  98.3 F   09/13/20 14:00


 


Pulse Rate  71   09/13/20 14:00


 


Respiratory Rate  18   09/13/20 14:00


 


Blood Pressure  132/85   09/13/20 14:00


 


O2 Sat by Pulse Oximetry (%)  100   09/13/20 14:00














PE:


Gen: NAD, awake, alert, oriented x3 sitting up in bed


HEENT: NC/AT, MICHELLE, MMM


LUNG: CTA b/l without wheezes or rhonchi


CARD: RRR no murmurs


ABD: Soft, ND, tenderness near surgical site, SONG drain with ~30 cc sanguinous 

fluid, laparoscopic sites C/D/I, normoactive BS


EXT: No edema, 2+ pulses b/l.





                                    CBC, BMP





                                 09/12/20 08:05 





                                 09/13/20 07:50 





                                  Hepatic Panel











Total Bilirubin  0.9 mg/dL (0.2-1)   09/13/20  07:50    


 


AST  36 U/L (15-37)   09/13/20  07:50    


 


ALT  148 U/L (13-61)  H  09/13/20  07:50    


 


Alkaline Phosphatase  157 U/L ()  H  09/13/20  07:50    


 


Albumin  3.1 g/dl (3.4-5.0)  L  09/13/20  07:50    











Active Medications





Metronidazole (Flagyl 500mg Premixed Ivpb -)  500 mg in 100 mls @ 100 mls/hr 

IVPB Q8H-IV [facility]AN


   Last Admin: 09/13/20 10:59 Dose:  100 mls/hr


   Documented by: 


Ceftriaxone Sodium 1 gm/ (Dextrose)  50 mls @ 100 mls/hr IVPB DAILY [facility]


   Last Admin: 09/13/20 10:59 Dose:  100 mls/hr


   Documented by: 


Levothyroxine Sodium (Synthroid -)  50 mcg PO ACBK [facility]


   Last Admin: 09/13/20 06:30 Dose:  50 mcg


   Documented by: 


Morphine Sulfate (Morphine Sulfate)  2 mg IVPUSH Q4H PRN


   PRN Reason: PAIN LEVEL 7 - 10


Ondansetron HCl (Zofran Injection)  4 mg IVPUSH Q6H PRN


   PRN Reason: NAUSEA AND/OR VOMITING


Oxycodone HCl (Roxicodone -)  5 mg PO Q6H PRN


   PRN Reason: PAIN LEVEL 1-5


   Last Admin: 09/13/20 00:07 Dose:  5 mg


   Documented by: 


Oxycodone HCl (Roxicodone -)  10 mg PO Q6H PRN


   PRN Reason: PAIN LEVEL 6-10


   Last Admin: 09/12/20 06:30 Dose:  10 mg


   Documented by: 


Pantoprazole Sodium (Protonix Iv)  40 mg IVPUSH DAILY [facility]


   Last Admin: 09/13/20 10:59 Dose:  40 mg


   Documented by: 











Assessment and Plan:


Acute calculus cholecystitis


History of Hypothyroidism





--POD 2 lap cholecystectomy


--Discussed with surgery: potentially will discontinue SONG drain tomorrow and 

would be okay to go home pending continued improvement


--Drain maintenance


--Advanced to regular diet today for trial


--Continue PRN pain control


--Continue Rocephin and Flagyl


--Patient to be out of bed to aid in recovery


--Incentive spirometer as directed





--Continue home Synthroid





Dispo: continue monitoring; anticipate d/c in 24-48hrs


Twan Diop DO - IM

## 2020-09-13 NOTE — OP
DATE OF OPERATION:  09/11/2020

 

PREOPERATIVE DIAGNOSIS:  Acute cholecystitis, cholelithiasis and Mirizzi's 
syndrome.

 

PROCEDURE:  Laparoscopic cholecystectomy.

 

SURGEON:  Woo Carty MD

 

ASSISTANT:  Becky Rowland PA-C

 

ANESTHESIA:  General.

 

OPERATIVE FINDINGS:  Acute cholecystitis and cholelithiasis and findings 
consistent

with a Mirizzi syndrome.  The stone impacted in the neck of the gallbladder was

compressing the distal common bile duct and the rest of the findings were

unremarkable.  

 

PROCEDURE:  The patient was placed on the operating table in the supine position
and

after the induction of general anesthesia the patient's abdomen was prepped with

ChloraPrep and draped in sterile fashion.  A timeout was taken and 
pneumoperitoneum

established above the umbilicus using a Veress needle.  Once 15 mmHg pressure 
were

obtained, a 5-mm port was placed at the umbilicus and additional lateral 5-mm 
ports

and a subxiphoid 12-mm port.  Laparoscopy was carried out and the previously 
noted

findings were observed.

 

Dissection was begun at the neck of the gallbladder where the peritoneum was 
opened

medially and laterally using blunt dissection and electrocautery.  The cystic 
duct

was identified coursing from the neck of the gallbladder towards the common bile
duct

and it was dissected using blunt dissection proximally and distally for length. 

Similarly, the artery was identified and dissected proximally and distally for

length.  A critical view of safety was taken and then the duct and the artery 
were

clipped twice proximally and twice distally with large hemoclips.  The duct and

artery were then serially divided using Endoshears.  Hemostasis was checked for 
and

noted to be good and then the gallbladder was removed from the liver bed in a

retrograde fashion using electrocautery.  Prior to removal from the edge of the

liver, hemostasis in the liver bed was again checked for and noted to be good 
and

then the gallbladder removed from the edge of the liver, placed in an Endo 
Catch, and

brought out through the subxiphoid port.

 

Pneumoperitoneum was reestablished.  Copious irrigation was carried out with 
saline. 

Hemostasis was verified again.  A 10-mm Sky-Clifford drain was placed in the 
right

hepatorenal fossa and brought out through 1 of the 5-mm ports and secured to the
skin

with 2-0 silk suture.  All port sites were removed under laparoscopic vision 
without

evidence of bleeding from the port sites.  The port sites were infiltrated with 
0.5%

Marcaine and the skin edges reapproximated with 4-0 Biosyn in a subcuticular

continuous fashion.  Steri-Strips and Band-Aid dressings were placed.  The drain
was

connected to bulb suction and then the patient aroused from general anesthesia 
and

transferred to the postanesthesia care unit in stable condition, awake and 
alert.  

 

ESTIMATED BLOOD LOSS:  20 mL.

 

REPLACEMENT:  Crystalloid.

 

DRAINS:  One 10-mm Sky-Clifford in the gallbladder fossa.

 

SPECIMEN:  Gallbladder and contents to Pathology.

 

I, Woo Carty, was physically present in the operating room from the time 
the

patient was placed on the operating table until she was transferred to the

postanesthesia care unit in my accompaniment.

 

 

MD JEROMY Addison/[id number]

DD: 09/13/2020 11:56

DT: 09/13/2020 12:18

Job #:  [id number]

MTDD

## 2020-09-13 NOTE — SURG
Surgery First Assist Note


First Assist: Becky Rowland PA-C


Date of Service: 09/11/20


Diagnosis: 





acute cholecystitis/cholelithiasis/Mirizzis syndrome





Procedure: 








 laparoscopic cholecystectomy





I was present for the entirety of the operative procedure. For further detail, 

please refer to operative report.








Visit type





- Case Type


Case Type: ED Admission





- Emergency


Emergency Visit: Yes


ED Registration Date: 09/11/20


Care time: The patient presented to the Emergency Department on the above date 

and was hospitalized for further evaluation of their emergent condition.





- New patient


This patient is new to me today: Yes


Date on this admission: 09/11/20

## 2020-09-14 LAB
ALBUMIN SERPL-MCNC: 2.9 G/DL (ref 3.4–5)
ALP SERPL-CCNC: 129 U/L (ref 45–117)
ALT SERPL-CCNC: 100 U/L (ref 13–61)
ANION GAP SERPL CALC-SCNC: 6 MMOL/L (ref 8–16)
AST SERPL-CCNC: 19 U/L (ref 15–37)
BILIRUB SERPL-MCNC: 0.9 MG/DL (ref 0.2–1)
BUN SERPL-MCNC: 10.6 MG/DL (ref 7–18)
CALCIUM SERPL-MCNC: 8.4 MG/DL (ref 8.5–10.1)
CHLORIDE SERPL-SCNC: 104 MMOL/L (ref 98–107)
CO2 SERPL-SCNC: 29 MMOL/L (ref 21–32)
CREAT SERPL-MCNC: 0.8 MG/DL (ref 0.55–1.3)
GLUCOSE SERPL-MCNC: 108 MG/DL (ref 74–106)
POTASSIUM SERPLBLD-SCNC: 3.4 MMOL/L (ref 3.5–5.1)
PROT SERPL-MCNC: 6.1 G/DL (ref 6.4–8.2)
SODIUM SERPL-SCNC: 139 MMOL/L (ref 136–145)

## 2020-09-14 RX ADMIN — PANTOPRAZOLE SODIUM SCH MG: 40 INJECTION, POWDER, FOR SOLUTION INTRAVENOUS at 10:11

## 2020-09-14 RX ADMIN — POLYETHYLENE GLYCOL 3350 SCH GM: 17 POWDER, FOR SOLUTION ORAL at 12:32

## 2020-09-14 RX ADMIN — LEVOTHYROXINE SODIUM SCH MCG: 50 TABLET ORAL at 06:37

## 2020-09-14 RX ADMIN — CEFTRIAXONE SCH MLS/HR: 1 INJECTION, POWDER, FOR SOLUTION INTRAMUSCULAR; INTRAVENOUS at 10:11

## 2020-09-14 NOTE — PN
Teaching Attending Note


Name of Resident: Kashmir Vale





ATTENDING PHYSICIAN STATEMENT





I saw and evaluated the patient.


I reviewed the resident's note and discussed the case with the resident.


I agree with the resident's findings and plan as documented.








SUBJECTIVE:


Seen and examined at bedside.  Patient with moderate abdominal tenderness.  Autumn

erating p.o., ambulating, and passing flatus.  Has not had a bowel movement.  SONG

drain still in place.  If SONG drain removed today will likely be discharged 

tomorrow





OBJECTIVE


                                Last Vital Signs











Temp Pulse Resp BP Pulse Ox


 


 98.8 F   61   16   143/78   96 


 


 09/14/20 06:00  09/14/20 06:00  09/14/20 06:00  09/14/20 06:00  09/14/20 06:00











PE: Per resident note


Labs/Imaging: reviewed





ASSESSMENT/PLAN


65-year-old female with a history of hypothyroidism, NAFLD, gastritis who 

presents with acute cholecystitis





#Severe sepsis 2/2 Acute calculus cholecystitis


s/p lap jonny


-LFTs downtrending


-surgery on board: appreciate recs


-SONG drain in place


-abx discontinued


-pain control





#Hypothyroidism


Continue home levothyroxine





#Gastritis


Pantoprazole

## 2020-09-14 NOTE — PN
Progress Note (short form)





- Note


Progress Note: 





Surgery





POD #3 Laparoscopic cholecystectomy.





Patient seen and examined at bedside with no complaints. She states that she is 

having a little pain but overall she's feeling well. She is tolerating her diet,

ambulating, passing flatus and voiding without limitation. She denies any CP, 

SOB, N/V/D fever or chills.





                                   Vital Signs











Temp  98.3 F   09/14/20 15:20


 


Pulse  71   09/14/20 15:20


 


Resp  18   09/14/20 15:20


 


BP  133/80   09/14/20 15:20


 


Pulse Ox  96   09/14/20 15:20








                                 Intake & Output











 09/13/20 09/14/20 09/14/20





 23:59 11:59 23:59


 


Intake Total 750 400 680


 


Output Total 40 50 50


 


Balance 710 350 630


 


Weight 152 lb  


 


Intake:   


 


  IVPB 150 50 


 


  Oral 600 350 680


 


Output:   


 


  Drainage 40 50 50


 


    Right Flank 40 50 50


 


Other:   


 


  Voiding Method Toilet Toilet Toilet


 


  # Unmeasured Voids   


 


    Void 2 2 2


 


  Bowel Movement No  No


 


  Height 5 ft 4 in  


 


  Body Mass Index (BMI) 26.1  








                                    CBC, BMP





                                 09/12/20 08:05 





                                 09/14/20 07:18 





PE:


A&Ox3, NAD


Unlabored resp on RA


ABD: Obese, soft, ND with TTP at port sites appropriate to status, no tracking 

erythema, edema or active d/c. Drain at RLQ secure in good position with 100cc 

d/c today. 


B/L LE  compartments soft, supple and non-tender with +2 DP pulses.





Problem List





- Problems


(1) S/P laparoscopic cholecystectomy


Assessment/Plan: 


POD #3 lap jonny doing well and tolerating her regular diet. No BM but passing 

flatus.





-measure and record drain output


-regular diet


-encourage OOB and IS


-plan for d/c home tomorrow after drain d/c'd





Evaluation and plan discussed with Dr Carty


Code(s): Z90.49 - ACQUIRED ABSENCE OF OTHER SPECIFIED PARTS OF DIGESTIVE TRACT

## 2020-09-14 NOTE — PN
Physical Exam: 


SUBJECTIVE: Patient seen and examined. Pt. states she is passing gas but has not

passed stool. Pt. has some abdominal discomfort. Pt. rates pain as 4/10 in 

severity. No acute events overnight.








OBJECTIVE:





                                   Vital Signs











 Period  Temp  Pulse  Resp  BP Sys/Zarate  Pulse Ox


 


 Last 24 Hr  97.5 F-98.8 F  61-72  16-18  132-160/68-80  94-97











GENERAL: The patient is awake, alert, in no acute distress.


HEAD: Normal with no signs of trauma.


EYES: Sclera anicteric, conjunctiva clear. 


ENT: Ears normal, nares patent, oropharynx clear without exudates, moist mucous 

membranes.


NECK: Trachea midline, full range of motion, supple. 


LUNGS: Breath sounds equal, clear to auscultation bilaterally, no wheezes, no 

crackles, no accessory muscle use. 


HEART: Regular rate and rhythm, S1, S2 without murmur


ABDOMEN: Soft, diffuse tenderness to palpation in suprapubic region?, non-tender

around incisional sites?, nondistended, hypoactive bowel sounds=


EXTREMITIES: 2+ pulses, warm, well-perfused, no edema. 


NEUROLOGICAL: No focal deficits, Normal speech, gait not observed.


PSYCH: Normal mood, normal affect.


SKIN: Warm, dry, normal turgor














                         Laboratory Results - last 24 hr











  09/14/20





  07:18


 


Sodium  139


 


Potassium  3.4 L


 


Chloride  104


 


Carbon Dioxide  29


 


Anion Gap  6 L


 


BUN  10.6


 


Creatinine  0.8


 


Est GFR (CKD-EPI)AfAm  89.04


 


Est GFR (CKD-EPI)NonAf  76.83


 


Random Glucose  108 H


 


Calcium  8.4 L


 


Total Bilirubin  0.9


 


AST  19


 


ALT  100 H


 


Alkaline Phosphatase  129 H


 


Total Protein  6.1 L


 


Albumin  2.9 L








Active Medications











Generic Name Dose Route Start Last Admin





  Trade Name Freq  PRN Reason Stop Dose Admin


 


Levothyroxine Sodium  50 mcg  09/12/20 07:00  09/14/20 06:37





  Synthroid -  PO   50 mcg





  ACBK ALAN   Administration


 


Morphine Sulfate  2 mg  09/11/20 17:31 





  Morphine Sulfate  IVPUSH  





  Q4H PRN  





  PAIN LEVEL 7 - 10  


 


Ondansetron HCl  4 mg  09/12/20 05:00 





  Zofran Injection  IVPUSH  





  Q6H PRN  





  NAUSEA AND/OR VOMITING  


 


Oxycodone HCl  5 mg  09/11/20 17:26  09/14/20 18:40





  Roxicodone -  PO   5 mg





  Q6H PRN   Administration





  PAIN LEVEL 1-5  


 


Oxycodone HCl  10 mg  09/11/20 17:26  09/12/20 06:30





  Roxicodone -  PO   10 mg





  Q6H PRN   Administration





  PAIN LEVEL 6-10  


 


Pantoprazole Sodium  40 mg  09/12/20 10:00  09/14/20 10:11





  Protonix Iv  IVPUSH   40 mg





  DAILY ALAN   Administration


 


Polyethylene Glycol  17 gm  09/14/20 11:30  09/14/20 12:32





  Miralax (For Daily Use) -  PO   17 gm





  DAILY ALAN   Administration











ASSESSMENT/PLAN:


Pt. is a 65 y.o. F w/ PMHx. of hypothyroidism, Fatty Liver, and gastritis who 

presenting with acute cholecystitis. 





#Severe sepsis 2/2 Acute calculus cholecystitis


s/p lap jonny POD#3


-LFTs downtrending


-surgery on board: appreciate recs


-SONG drain in place


-abx discontinued


-pain control





#Hypothyroidism


Continue home levothyroxine





#Gastritis


c/w Pantoprazole





#FEN


-no IVF encourage PO intake 


-monitor electrolytes and replete as needed


-Clear, advance as tolerated 





#DVT Ppx. 


-Early ambulation





#Dispo


DC in AM, after drain pulled and Pt. tolerating PO





Visit type





- Emergency Visit


Emergency Visit: Yes


ED Registration Date: 09/11/20


Care time: The patient presented to the Emergency Department on the above date 

and was hospitalized for further evaluation of their emergent condition.





- New Patient


This patient is new to me today: Yes


Date on this admission: 09/14/20





- Critical Care


Critical Care patient: No





- Discharge Referral


Referred to [facility] No





- Medication Review


Med list reviewed for High Risk Meds patients 65 and older: Yes





ATTENDING PHYSICIAN STATEMENT





I saw and evaluated the patient.


I reviewed the resident's note and discussed the case with the resident.


I agree with the resident's findings and plan as documented.








SUBJECTIVE:








OBJECTIVE:








ASSESSMENT AND PLAN:

## 2020-09-15 VITALS — HEART RATE: 60 BPM | DIASTOLIC BLOOD PRESSURE: 79 MMHG | SYSTOLIC BLOOD PRESSURE: 140 MMHG | TEMPERATURE: 98.5 F

## 2020-09-15 LAB
ALBUMIN SERPL-MCNC: 3.1 G/DL (ref 3.4–5)
ALP SERPL-CCNC: 118 U/L (ref 45–117)
ALT SERPL-CCNC: 94 U/L (ref 13–61)
ANION GAP SERPL CALC-SCNC: 7 MMOL/L (ref 8–16)
AST SERPL-CCNC: 41 U/L (ref 15–37)
BILIRUB SERPL-MCNC: 0.4 MG/DL (ref 0.2–1)
BUN SERPL-MCNC: 15.5 MG/DL (ref 7–18)
CALCIUM SERPL-MCNC: 8.4 MG/DL (ref 8.5–10.1)
CHLORIDE SERPL-SCNC: 106 MMOL/L (ref 98–107)
CO2 SERPL-SCNC: 25 MMOL/L (ref 21–32)
CREAT SERPL-MCNC: 0.8 MG/DL (ref 0.55–1.3)
GLUCOSE SERPL-MCNC: 115 MG/DL (ref 74–106)
MAGNESIUM SERPL-MCNC: 2.2 MG/DL (ref 1.8–2.4)
POTASSIUM SERPLBLD-SCNC: 4 MMOL/L (ref 3.5–5.1)
PROT SERPL-MCNC: 6.4 G/DL (ref 6.4–8.2)
SODIUM SERPL-SCNC: 139 MMOL/L (ref 136–145)

## 2020-09-15 RX ADMIN — POLYETHYLENE GLYCOL 3350 SCH GM: 17 POWDER, FOR SOLUTION ORAL at 09:42

## 2020-09-15 RX ADMIN — LEVOTHYROXINE SODIUM SCH MCG: 50 TABLET ORAL at 06:47

## 2020-09-15 RX ADMIN — PANTOPRAZOLE SODIUM SCH MG: 40 INJECTION, POWDER, FOR SOLUTION INTRAVENOUS at 09:42

## 2020-09-15 NOTE — PN
Progress Note (short form)





- Note


Progress Note: 





Surgery





POD #4 Laparoscopic cholecystectomy.





Patient seen and examined at bedside with no complaints. She states that she is 

having a little pain but overall she's feeling well. She is tolerating her diet,

ambulating,  voiding and had a BM. She denies any CP, SOB, N/V/D fever or 

chills.





                                   Vital Signs











Temp  98.2 F   09/15/20 06:00


 


Pulse  67   09/15/20 06:00


 


Resp  18   09/15/20 06:00


 


BP  145/80   09/15/20 06:00


 


Pulse Ox  99   09/15/20 06:00








                                 Intake & Output











 09/14/20 09/14/20 09/15/20





 11:59 23:59 11:59


 


Intake Total 400 1830 500


 


Output Total 50 100 40


 


Balance 350 1730 460


 


Intake:   


 


  IVPB 50 150 


 


  Oral 350 1680 500


 


Output:   


 


  Drainage 50 100 40


 


    Right Flank 50 100 40


 


Other:   


 


  Voiding Method Toilet Toilet Toilet


 


  # Unmeasured Voids   


 


    Void 2 3 2


 


  Bowel Movement  Yes No





                                    CBC, BMP





                                 09/12/20 08:05 





                                 09/14/20 07:18 











PE:


A&Ox3, NAD


Unlabored resp on RA


ABD: Obese, soft, ND with diffuse TTP at port sites appropriate to status, no 

tracking erythema, edema or active d/c. Drain d/c'd on rounds this morning with 

tip fully intact, drain ostomy site clean and dry with no active d/c


B/L LE  compartments soft, supple and non-tender with +2 DP pulses.





Problem List





- Problems


(1) S/P laparoscopic cholecystectomy


Assessment/Plan: 


POD #4 lap jonny doing well and tolerating her regular diet and moving her 

bowels.





-regular diet


-encourage OOB and IS


-cleared from surgical standpoint for d/c home today





Evaluation and plan discussed with Dr Carty


Code(s): Z90.49 - ACQUIRED ABSENCE OF OTHER SPECIFIED PARTS OF DIGESTIVE TRACT

## 2020-09-15 NOTE — DS
Physical Exam: 


SUBJECTIVE: Patient seen and examined at bedside. No acute events.








OBJECTIVE:





                                   Vital Signs











 Period  Temp  Pulse  Resp  BP Sys/Zarate  Pulse Ox


 


 Last 24 Hr  98.0 F-98.5 F  63-71  16-21  132-145/67-80  96-99








PHYSICAL EXAM





GENERAL: The patient is awake, alert, in no acute distress.


HEAD: Normal with no signs of trauma.


EYES: Sclera anicteric, conjunctiva clear. 


ENT: Ears normal, nares patent, oropharynx clear without exudates, moist mucous 

membranes.


NECK: Trachea midline, full range of motion, supple. 


LUNGS: Breath sounds equal, clear to auscultation bilaterally, no wheezes, no 

crackles, no accessory muscle use. 


HEART: Regular rate and rhythm, S1, S2 without murmur


ABDOMEN: Soft, mildly tender to palpation. Hypoactive bowel sounds. No rebound 

tenderness/guarding. Well-healed surgical incisions.


EXTREMITIES: 2+ pulses, warm, well-perfused, no edema. 


NEUROLOGICAL: No focal deficits, Normal speech, gait not observed.


PSYCH: Normal mood, normal affect.


SKIN: Warm, dry, normal turgor





LABS


                         Laboratory Results - last 24 hr











  09/15/20





  08:40


 


Sodium  139


 


Potassium  4.0


 


Chloride  106


 


Carbon Dioxide  25


 


Anion Gap  7 L


 


BUN  15.5


 


Creatinine  0.8


 


Est GFR (CKD-EPI)AfAm  89.04


 


Est GFR (CKD-EPI)NonAf  76.83


 


Random Glucose  115 H


 


Calcium  8.4 L


 


Magnesium  2.2


 


Total Bilirubin  0.4


 


AST  41 H


 


ALT  94 H


 


Alkaline Phosphatase  118 H


 


Total Protein  6.4


 


Albumin  3.1 L











HOSPITAL COURSE:





Date of Admission:09/11/20





65 y.o. F w/ PMHx. of hypothyroidism, Fatty Liver, and gastritis presented with 

acute acalculous cholecystitis. CTAP showed gallbladder overdistended with 

diffuse wall thickening, pericholecystic stranding. 2.7cm calculus at 

gallbladder neck. Right upper quadrant ultrasound revealed common bile duct at 

0.7cm, without gross calculus noted. Pt was given IV fluids and IV Ceftriax

one/Flagyl. She underwent lap jonny with no complications. SONG drain was placed 

after procedure. She was monitored in the hospital and SONG drain was subsequently

removed. Her symptoms improved and she was discharged home with Zofran for 

nausea PRN. Additionally, she was advised to follow up with her PCP and surgeon 

in 1 week.





Date of Discharge: 09/15/20











Minutes to complete discharge: 36





Discharge Summary


Problems reviewed: Yes


Reason For Visit: ACUTE CHOLECYSTITIS DUE TO BILIARY CALCULUS


Condition: Stable





- Instructions


Diet, Activity, Other Instructions: 


You came in for abdominal pain. We imaged your abdomen and saw that you had 

stones in your gallbladder with signs of infection. We also saw that you have a 

fatty liver and a probable cyst in your liver. You were seen by a surgeon and 

your gallbladder was removed. You were treated with IV antibiotics. 





Please continue taking your home medications as they were prescribed.





Please follow up with you General Surgeon within 1 week. 


Please follow up with your PCP within 1 week. 





Please return to the ED if you are having fever, chills, worsening abdominal 

pain r any other concerning symptoms. 





 

________________________________________________________________________________


________________________________________


Lleg por dolor abdominal. Tomamos imgenes de nichols abdomen y vimos que torey 

clculos en la vescula biliar con signos de infeccin. Tambin vimos que tiene 

un hgado graso y un posible quiste en el hgado. Lo nadege un cirujano y le 

extirparon la vescula biliar. Fue tratado con antibiticos por va intravenosa.





Contine tomando thelma medicamentos caseros jayden se los recetaron.





Tami un seguimiento con nichols cirujano general dentro de lorelei semana.


Tami un seguimiento con nichols PCP dentro de lorelei semana.





Regrese al servicio de urgencias si tiene fiebre, escalofros, dolor abdominal 

que empeora o cualquier otro sntoma preocupante.














 


Referrals: 


Woo Carty MD [Staff Physician] - 


Blake Powell MD [Primary Care Provider] - 


Disposition: HOME





- Home Medications


Comprehensive Discharge Medication List: 


Ambulatory Orders





Levothyroxine [Synthroid -] 50 mcg PO DAILY 09/10/20 








This patient is new to me today: Yes


Date on this admission: 09/15/20


Emergency Visit: Yes


ED Registration Date: 09/11/20


Care time: The patient presented to the Emergency Department on the above date 

and was hospitalized for further evaluation of their emergent condition.


Critical Care patient: No





- Discharge Referral


Referred to [facility] No





ATTENDING PHYSICIAN STATEMENT





I saw and evaluated the patient.


I reviewed the resident's note and discussed the case with the resident.


I agree with the resident's findings and plan as documented.








SUBJECTIVE:








OBJECTIVE:








ASSESSMENT AND PLAN:

## 2020-09-15 NOTE — PN
Teaching Attending Note


Name of Resident: Pushpa Contreras





ATTENDING PHYSICIAN STATEMENT





I saw and evaluated the patient.


I reviewed the resident's note and discussed the case with the resident.


I agree with the resident's findings and plan as documented.








SUBJECTIVE:


Seen and examined at bedside. SONG drain removed.  Abdominal tenderness decreased.

 Patient vomited this morning, but nausea resolved upon examination.  Patient 

subsequently had a bowel movement and a KUB did not show any evidence of 

obstruction or ileus.  Patient is medically cleared for discharge





OBJECTIVE


                                Last Vital Signs











Temp Pulse Resp BP Pulse Ox


 


 98.3 F   70   21 H  134/67   99 


 


 09/15/20 10:36  09/15/20 10:36  09/15/20 10:36  09/15/20 10:36  09/15/20 06:00











PE: Per resident note


Labs/Imaging: reviewed





ASSESSMENT/PLAN


65-year-old female with a history of hypothyroidism, NAFLD, gastritis who 

presented with severe sepsis acute cholecystitis.  Patient was treated with 

antibiotics and fluids followed by a laparoscopic cholecystectomy with 

resolution of sepsis.  SONG drain was placed and removed and patient is now 

hemodynamically stable and medically cleared for discharge.





#Severe sepsis 2/2 Acute calculus cholecystitis


s/p lap jonny


-LFTs downtrending


-surgery on board: appreciate recs


-SONG drain in place


-abx discontinued


-pain control





#Hypothyroidism


Continue home levothyroxine





#Gastritis


Pantoprazole

## 2020-09-16 NOTE — PATH
Surgical Pathology Report



Patient Name:  SANDY MCADAMS

Accession #:  [id number]

Med. Rec. #:  [id number]                                                        

   /Age/Gender:  1955 (Age: 65) / F

Account:  [id number]                                                          

             Location: [address] [place] [address]/[facility]

Taken:  2020

Received:  2020

Reported:  2020

Physicians:  Woo Caryt MD

  



Specimen(s) Received

 GALLBLADDER 





Clinical History

Acute cholecystitis due to biliary calculus







Final Diagnosis

GALLBLADDER, CHOLECYSTECTOMY:

ACUTE AND CHRONIC CHOLECYSTITIS.

CHOLELITHIASIS.





***Electronically Signed***

Sussy Corley M.D.





Gross Description

Received in formalin, labeled "gallbladder," is a 9.0 x 9.0 x 2.9 cm.

gallbladder with a 0.2 cm. in length portion of cystic duct attached. The outer

surface varies from smooth to shaggy. The lumen contains with bile and a stone

measuring 2.5cm in greatest dimension. The mucosa is hemorrhagic and eroded. No

mass is noted. The wall of the gallbladder measures up to 0.7cm. in thickness.

Representative sections are submitted in one cassette.  

KWS/9/15/2020



yonis/9/15/2020

## 2022-09-16 ENCOUNTER — HOSPITAL ENCOUNTER (EMERGENCY)
Dept: HOSPITAL 74 - JER | Age: 67
LOS: 1 days | Discharge: HOME | End: 2022-09-17
Payer: COMMERCIAL

## 2022-09-16 VITALS — RESPIRATION RATE: 18 BRPM

## 2022-09-16 VITALS — BODY MASS INDEX: 26.6 KG/M2

## 2022-09-16 DIAGNOSIS — M54.6: ICD-10-CM

## 2022-09-16 DIAGNOSIS — R07.9: Primary | ICD-10-CM

## 2022-09-16 DIAGNOSIS — R05.2: ICD-10-CM

## 2022-09-16 LAB
ALBUMIN SERPL-MCNC: 3.6 G/DL (ref 3.4–5)
ALP SERPL-CCNC: 85 U/L (ref 45–117)
ALT SERPL-CCNC: 32 U/L (ref 13–61)
ANION GAP SERPL CALC-SCNC: 6 MMOL/L (ref 8–16)
AST SERPL-CCNC: 16 U/L (ref 15–37)
BASOPHILS # BLD: 1.1 % (ref 0–2)
BILIRUB SERPL-MCNC: 0.4 MG/DL (ref 0.2–1)
BUN SERPL-MCNC: 13.1 MG/DL (ref 7–18)
CALCIUM SERPL-MCNC: 8.5 MG/DL (ref 8.5–10.1)
CHLORIDE SERPL-SCNC: 108 MMOL/L (ref 98–107)
CO2 SERPL-SCNC: 30 MMOL/L (ref 21–32)
CREAT SERPL-MCNC: 0.8 MG/DL (ref 0.55–1.3)
DEPRECATED RDW RBC AUTO: 14.1 % (ref 11.6–15.6)
EOSINOPHIL # BLD: 1.4 % (ref 0–4.5)
GLUCOSE SERPL-MCNC: 124 MG/DL (ref 74–106)
HCT VFR BLD CALC: 40.2 % (ref 32.4–45.2)
HGB BLD-MCNC: 13.9 GM/DL (ref 10.7–15.3)
LIPASE SERPL-CCNC: 204 U/L (ref 73–393)
LYMPHOCYTES # BLD: 40.1 % (ref 8–40)
MCH RBC QN AUTO: 30.8 PG (ref 25.7–33.7)
MCHC RBC AUTO-ENTMCNC: 34.7 G/DL (ref 32–36)
MCV RBC: 88.9 FL (ref 80–96)
MONOCYTES # BLD AUTO: 9.8 % (ref 3.8–10.2)
NEUTROPHILS # BLD: 47.6 % (ref 42.8–82.8)
PLATELET # BLD AUTO: 260 10^3/UL (ref 134–434)
PMV BLD: 8.2 FL (ref 7.5–11.1)
PROT SERPL-MCNC: 6.8 G/DL (ref 6.4–8.2)
RBC # BLD AUTO: 4.52 M/MM3 (ref 3.6–5.2)
SODIUM SERPL-SCNC: 144 MMOL/L (ref 136–145)
WBC # BLD AUTO: 4.8 K/MM3 (ref 4–10)

## 2022-09-16 PROCEDURE — 3E0333Z INTRODUCTION OF ANTI-INFLAMMATORY INTO PERIPHERAL VEIN, PERCUTANEOUS APPROACH: ICD-10-PCS

## 2022-09-17 VITALS — HEART RATE: 53 BPM | TEMPERATURE: 97.8 F | DIASTOLIC BLOOD PRESSURE: 66 MMHG | SYSTOLIC BLOOD PRESSURE: 154 MMHG

## 2022-09-17 LAB
APPEARANCE UR: CLEAR
BILIRUB UR STRIP.AUTO-MCNC: NEGATIVE MG/DL
COLOR UR: YELLOW
KETONES UR QL STRIP: NEGATIVE
LEUKOCYTE ESTERASE UR QL STRIP.AUTO: NEGATIVE
NITRITE UR QL STRIP: NEGATIVE
PH UR: 6 [PH] (ref 5–8)
PROT UR QL STRIP: NEGATIVE
PROT UR QL STRIP: NEGATIVE
SP GR UR: 1.02 (ref 1.01–1.03)
UROBILINOGEN UR STRIP-MCNC: 0.2 MG/DL (ref 0.2–1)

## 2025-03-06 ENCOUNTER — HOSPITAL ENCOUNTER (EMERGENCY)
Dept: HOSPITAL 74 - JER | Age: 70
LOS: 1 days | Discharge: HOME | End: 2025-03-07
Payer: COMMERCIAL

## 2025-03-06 VITALS — TEMPERATURE: 98.1 F

## 2025-03-06 VITALS — BODY MASS INDEX: 24 KG/M2

## 2025-03-06 DIAGNOSIS — R06.02: ICD-10-CM

## 2025-03-06 DIAGNOSIS — I10: ICD-10-CM

## 2025-03-06 DIAGNOSIS — R07.89: Primary | ICD-10-CM

## 2025-03-07 VITALS — DIASTOLIC BLOOD PRESSURE: 76 MMHG | SYSTOLIC BLOOD PRESSURE: 148 MMHG | HEART RATE: 88 BPM

## 2025-03-07 VITALS — RESPIRATION RATE: 16 BRPM

## 2025-03-07 LAB
ALBUMIN SERPL-MCNC: 4 G/DL (ref 3.4–5)
ALP SERPL-CCNC: 108 U/L (ref 45–117)
ALT SERPL-CCNC: 24 U/L (ref 13–61)
ANION GAP SERPL CALC-SCNC: 5 MMOL/L (ref 4–13)
AST SERPL-CCNC: 19 U/L (ref 15–37)
BASOPHILS # BLD: 0.9 % (ref 0–2)
BILIRUB SERPL-MCNC: 0.4 MG/DL (ref 0.2–1)
BNP SERPL-MCNC: 96.5 PG/ML (ref 5–125)
BUN SERPL-MCNC: 16.7 MG/DL (ref 7–18)
CALCIUM SERPL-MCNC: 9 MG/DL (ref 8.5–10.1)
CHLORIDE SERPL-SCNC: 109 MMOL/L (ref 98–107)
CO2 SERPL-SCNC: 26 MMOL/L (ref 21–32)
CREAT SERPL-MCNC: 0.7 MG/DL (ref 0.55–1.3)
DEPRECATED RDW RBC AUTO: 14 % (ref 11.6–15.6)
EOSINOPHIL # BLD: 1.2 % (ref 0–4.5)
GLUCOSE SERPL-MCNC: 110 MG/DL (ref 74–106)
HCT VFR BLD CALC: 42.8 % (ref 32.4–45.2)
HGB BLD-MCNC: 14.5 GM/DL (ref 10.7–15.3)
LYMPHOCYTES # BLD: 34.1 % (ref 8–40)
MAGNESIUM SERPL-MCNC: 2.4 MG/DL (ref 1.8–2.4)
MCH RBC QN AUTO: 30 PG (ref 25.7–33.7)
MCHC RBC AUTO-ENTMCNC: 34 G/DL (ref 32–36)
MCV RBC: 88.3 FL (ref 80–96)
MONOCYTES # BLD AUTO: 9.1 % (ref 3.8–10.2)
NEUTROPHILS # BLD: 54.7 % (ref 42.8–82.8)
PLATELET # BLD AUTO: 222 10^3/UL (ref 134–434)
PMV BLD: 7.6 FL (ref 7.5–11.1)
POTASSIUM SERPLBLD-SCNC: 5.1 MMOL/L (ref 3.5–5.1)
PROT SERPL-MCNC: 7.5 G/DL (ref 6.4–8.2)
RBC # BLD AUTO: 4.85 M/MM3 (ref 3.6–5.2)
SODIUM SERPL-SCNC: 140 MMOL/L (ref 136–145)
WBC # BLD AUTO: 6.4 K/MM3 (ref 4–10)

## 2025-03-07 PROCEDURE — 3E033NZ INTRODUCTION OF ANALGESICS, HYPNOTICS, SEDATIVES INTO PERIPHERAL VEIN, PERCUTANEOUS APPROACH: ICD-10-PCS

## 2025-03-07 RX ADMIN — ACETAMINOPHEN ONE MG: 10 INJECTION, SOLUTION INTRAVENOUS at 00:39
